# Patient Record
Sex: FEMALE | RURAL
[De-identification: names, ages, dates, MRNs, and addresses within clinical notes are randomized per-mention and may not be internally consistent; named-entity substitution may affect disease eponyms.]

---

## 2019-01-15 ENCOUNTER — HISTORICAL (OUTPATIENT)
Dept: ADMINISTRATIVE | Facility: HOSPITAL | Age: 74
End: 2019-01-15

## 2019-01-21 LAB
LAB AP CLINICAL INFORMATION: NORMAL
LAB AP DIAGNOSIS - HISTORICAL: NORMAL
LAB AP GROSS PATHOLOGY - HISTORICAL: NORMAL
LAB AP SPECIMEN SUBMITTED - HISTORICAL: NORMAL

## 2019-10-29 ENCOUNTER — HISTORICAL (OUTPATIENT)
Dept: ADMINISTRATIVE | Facility: HOSPITAL | Age: 74
End: 2019-10-29

## 2024-10-11 ENCOUNTER — HOSPITAL ENCOUNTER (EMERGENCY)
Facility: HOSPITAL | Age: 79
Discharge: HOME | End: 2024-10-11
Payer: MEDICARE

## 2024-10-11 VITALS
BODY MASS INDEX: 38.57 KG/M2 | HEIGHT: 66 IN | SYSTOLIC BLOOD PRESSURE: 122 MMHG | OXYGEN SATURATION: 92 % | WEIGHT: 240 LBS | TEMPERATURE: 99 F | HEART RATE: 115 BPM | RESPIRATION RATE: 18 BRPM | DIASTOLIC BLOOD PRESSURE: 78 MMHG

## 2024-10-11 DIAGNOSIS — S60.221A CONTUSION OF RIGHT HAND, INITIAL ENCOUNTER: Primary | ICD-10-CM

## 2024-10-11 DIAGNOSIS — R52 PAIN: ICD-10-CM

## 2024-10-11 PROCEDURE — 99284 EMERGENCY DEPT VISIT MOD MDM: CPT | Mod: 25

## 2024-10-11 PROCEDURE — 63600175 PHARM REV CODE 636 W HCPCS

## 2024-10-11 PROCEDURE — 96374 THER/PROPH/DIAG INJ IV PUSH: CPT

## 2024-10-11 PROCEDURE — 96375 TX/PRO/DX INJ NEW DRUG ADDON: CPT

## 2024-10-11 RX ORDER — HYDROXYZINE HYDROCHLORIDE 25 MG/1
25 TABLET, FILM COATED ORAL
COMMUNITY

## 2024-10-11 RX ORDER — DOXAZOSIN 1 MG/1
1 TABLET ORAL
COMMUNITY
Start: 2024-07-14

## 2024-10-11 RX ORDER — MORPHINE SULFATE 4 MG/ML
2 INJECTION, SOLUTION INTRAMUSCULAR; INTRAVENOUS
Status: COMPLETED | OUTPATIENT
Start: 2024-10-11 | End: 2024-10-11

## 2024-10-11 RX ORDER — TRAMADOL HYDROCHLORIDE 50 MG/1
50 TABLET ORAL EVERY 6 HOURS PRN
Qty: 10 TABLET | Refills: 0 | Status: SHIPPED | OUTPATIENT
Start: 2024-10-11

## 2024-10-11 RX ORDER — FUROSEMIDE 20 MG/1
40 TABLET ORAL
COMMUNITY

## 2024-10-11 RX ORDER — FLECAINIDE ACETATE 50 MG/1
50 TABLET ORAL
COMMUNITY

## 2024-10-11 RX ORDER — ONDANSETRON HYDROCHLORIDE 2 MG/ML
4 INJECTION, SOLUTION INTRAVENOUS
Status: DISCONTINUED | OUTPATIENT
Start: 2024-10-11 | End: 2024-10-11

## 2024-10-11 RX ORDER — MORPHINE SULFATE 4 MG/ML
2 INJECTION, SOLUTION INTRAMUSCULAR; INTRAVENOUS
Status: DISCONTINUED | OUTPATIENT
Start: 2024-10-11 | End: 2024-10-11

## 2024-10-11 RX ORDER — ONDANSETRON HYDROCHLORIDE 2 MG/ML
4 INJECTION, SOLUTION INTRAVENOUS
Status: COMPLETED | OUTPATIENT
Start: 2024-10-11 | End: 2024-10-11

## 2024-10-11 RX ORDER — METOPROLOL SUCCINATE 25 MG/1
25 TABLET, EXTENDED RELEASE ORAL
COMMUNITY

## 2024-10-11 RX ORDER — DILTIAZEM HYDROCHLORIDE 240 MG/1
240 CAPSULE, COATED, EXTENDED RELEASE ORAL
COMMUNITY

## 2024-10-11 RX ORDER — ACETAMINOPHEN, DIPHENHYDRAMINE HCL, PHENYLEPHRINE HCL 325; 25; 5 MG/1; MG/1; MG/1
TABLET ORAL
COMMUNITY

## 2024-10-11 RX ORDER — DOCUSATE SODIUM 100 MG/1
100 CAPSULE, LIQUID FILLED ORAL DAILY
COMMUNITY

## 2024-10-11 RX ORDER — IBUPROFEN 100 MG/5ML
1000 SUSPENSION, ORAL (FINAL DOSE FORM) ORAL 2 TIMES DAILY
COMMUNITY

## 2024-10-11 RX ORDER — OMEPRAZOLE 40 MG/1
40 CAPSULE, DELAYED RELEASE ORAL
COMMUNITY

## 2024-10-11 RX ORDER — LOSARTAN POTASSIUM 100 MG/1
0.5 TABLET ORAL 2 TIMES DAILY
COMMUNITY

## 2024-10-11 RX ADMIN — ONDANSETRON 4 MG: 2 INJECTION INTRAMUSCULAR; INTRAVENOUS at 10:10

## 2024-10-11 RX ADMIN — MORPHINE SULFATE 2 MG: 4 INJECTION, SOLUTION INTRAMUSCULAR; INTRAVENOUS at 10:10

## 2024-10-11 NOTE — ED TRIAGE NOTES
Reports fell into door frame in home on Wednesday and hurt hand.  States woke up yesterday morning with it hurting severely.  Unable to stand for it to be touched.

## 2024-10-11 NOTE — DISCHARGE INSTRUCTIONS
- rest, ice, elevate extremity when not using  - prescription given for thumb spica and wrist splint  - [facility] will contact you with outpatient    appointment  - Return to the ED as needed

## 2024-10-11 NOTE — ED PROVIDER NOTES
Encounter Date: 10/11/2024       History     Chief Complaint   Patient presents with    Hand Injury     WENDY is a 78 y/o  female with a PMHx significant for GERD and Paroxysmal Atrial Fibrillation presents to the ED POV with c/o right hand/wrist pain s/p fall. Patient stated that her current symptoms started yesterday after bracing to stop herself from falling, at which point she struck her hand on the door frame at home. Moderated swelling noted to her hand. Patient is tender mostly around the Basal Carpometacarpal Joint. Patient has bounding radial pulses and brisk capillary refill in right hand. Patient rates her pain as a 10/10.     The history is provided by the patient.   Hand Injury   The incident occurred two days ago. The injury mechanism was a fall. The pain is present in the right hand. The quality of the pain is described as aching and throbbing. The pain is at a severity of 10/10. The pain has been Constant since the incident. Pertinent negatives include no fever and no malaise/fatigue. She reports no foreign bodies present. The symptoms are aggravated by movement and use. She has tried nothing for the symptoms.     Review of patient's allergies indicates:  No Known Allergies  Past Medical History:   Diagnosis Date    GERD (gastroesophageal reflux disease)     Paroxysmal atrial fibrillation      Past Surgical History:   Procedure Laterality Date    CERVICAL SPINE SURGERY      CHOLECYSTECTOMY      HYSTERECTOMY      INSERTION OF PACEMAKER      JOINT REPLACEMENT      right TKR and bilateral THR     No family history on file.  Social History     Tobacco Use    Smoking status: Never    Smokeless tobacco: Never   Substance Use Topics    Alcohol use: Never    Drug use: Never     Review of Systems   Constitutional: Negative.  Negative for fever and malaise/fatigue.   HENT: Negative.     Eyes: Negative.    Respiratory: Negative.     Cardiovascular: Negative.    Gastrointestinal: Negative.    Endocrine:  Negative.    Genitourinary: Negative.    Musculoskeletal:  Positive for arthralgias, joint swelling and myalgias.   Skin: Negative.    Allergic/Immunologic: Negative.    Neurological: Negative.    Hematological: Negative.    Psychiatric/Behavioral: Negative.         Physical Exam     Initial Vitals [10/11/24 0947]   BP Pulse Resp Temp SpO2   (!) 142/83 (!) 122 18 98.7 °F (37.1 °C) 95 %      MAP       --         Physical Exam    Nursing note and vitals reviewed.  Cardiovascular:  Normal rate, regular rhythm, S1 normal, S2 normal, normal heart sounds, intact distal pulses and normal pulses.           Pulmonary/Chest: Effort normal and breath sounds normal.   Musculoskeletal:      Right upper arm: Normal. No swelling, edema, deformity, lacerations, tenderness or bony tenderness.      Right elbow: Normal.      Right forearm: Swelling and tenderness present. No edema, deformity, lacerations or bony tenderness.      Right wrist: Swelling and tenderness present. No deformity, effusion, lacerations, bony tenderness, snuff box tenderness or crepitus. Normal range of motion. Normal pulse.      Right hand: Swelling and tenderness present. No deformity, lacerations or bony tenderness. Decreased range of motion. Decreased strength of finger abduction, thumb/finger opposition and wrist extension. Normal sensation. There is no disruption of two-point discrimination. Normal capillary refill. Normal pulse.     Neurological: She is alert and oriented to person, place, and time. She has normal reflexes.   Skin: Skin is warm, dry and intact. Capillary refill takes less than 2 seconds. No rash noted.   Psychiatric: She has a normal mood and affect. Her speech is normal and behavior is normal. Judgment and thought content normal. Cognition and memory are normal.         Medical Screening Exam   See Full Note    ED Course   Procedures  Labs Reviewed - No data to display       Imaging Results              X-Ray Hand 3 view Right (Final  result)  Result time 10/11/24 10:26:38      Final result by Tyson Lam MD (10/11/24 10:26:38)                   Impression:      Moderate soft tissue swelling about the right wrist and distal forearm, without acute osseous abnormality.      Electronically signed by: Tyson Lam MD  Date:    10/11/2024  Time:    10:26               Narrative:    EXAMINATION:  XR FOREARM RIGHT; XR WRIST COMPLETE 3 VIEWS RIGHT; XR HAND COMPLETE 3 VIEW RIGHT    CLINICAL HISTORY:  pain;Pain, unspecified    TECHNIQUE:  AP and lateral views of the right forearm were performed.  Three views right wrist.  Three views right hand.    COMPARISON:  None    FINDINGS:  There is no fracture or dislocation.  There is mild degenerative change of the triscaphe articulation and 1st carpometacarpal joint.  Scattered degenerative changes involve the interphalangeal joints range from mild to moderate..  There is moderate soft tissue swelling about the distal forearm and wrist.                                       X-Ray Wrist Complete Right (Final result)  Result time 10/11/24 10:26:38      Final result by Tyson Lam MD (10/11/24 10:26:38)                   Impression:      Moderate soft tissue swelling about the right wrist and distal forearm, without acute osseous abnormality.      Electronically signed by: Tyson Lam MD  Date:    10/11/2024  Time:    10:26               Narrative:    EXAMINATION:  XR FOREARM RIGHT; XR WRIST COMPLETE 3 VIEWS RIGHT; XR HAND COMPLETE 3 VIEW RIGHT    CLINICAL HISTORY:  pain;Pain, unspecified    TECHNIQUE:  AP and lateral views of the right forearm were performed.  Three views right wrist.  Three views right hand.    COMPARISON:  None    FINDINGS:  There is no fracture or dislocation.  There is mild degenerative change of the triscaphe articulation and 1st carpometacarpal joint.  Scattered degenerative changes involve the interphalangeal joints range from mild to moderate..  There is  moderate soft tissue swelling about the distal forearm and wrist.                                       X-Ray Forearm Right (Final result)  Result time 10/11/24 10:26:38      Final result by Tyson Lam MD (10/11/24 10:26:38)                   Impression:      Moderate soft tissue swelling about the right wrist and distal forearm, without acute osseous abnormality.      Electronically signed by: Tyson Lam MD  Date:    10/11/2024  Time:    10:26               Narrative:    EXAMINATION:  XR FOREARM RIGHT; XR WRIST COMPLETE 3 VIEWS RIGHT; XR HAND COMPLETE 3 VIEW RIGHT    CLINICAL HISTORY:  pain;Pain, unspecified    TECHNIQUE:  AP and lateral views of the right forearm were performed.  Three views right wrist.  Three views right hand.    COMPARISON:  None    FINDINGS:  There is no fracture or dislocation.  There is mild degenerative change of the triscaphe articulation and 1st carpometacarpal joint.  Scattered degenerative changes involve the interphalangeal joints range from mild to moderate..  There is moderate soft tissue swelling about the distal forearm and wrist.                                    X-Rays:   Independently Interpreted Readings:   Other Readings:  Reading Physician Reading Date Result Priority  Tyson Lam MD  [phone]  [phone] 10/11/2024     Narrative & Impression  EXAMINATION:  XR FOREARM RIGHT; XR WRIST COMPLETE 3 VIEWS RIGHT; XR HAND COMPLETE 3 VIEW RIGHT     CLINICAL HISTORY:  pain;Pain, unspecified     TECHNIQUE:  AP and lateral views of the right forearm were performed.  Three views right wrist.  Three views right hand.     COMPARISON:  None     FINDINGS:  There is no fracture or dislocation.  There is mild degenerative change of the triscaphe articulation and 1st carpometacarpal joint.  Scattered degenerative changes involve the interphalangeal joints range from mild to moderate..  There is moderate soft tissue swelling about the distal forearm and  wrist.     Impression:     Moderate soft tissue swelling about the right wrist and distal forearm, without acute osseous abnormality.        Electronically signed by:Tyson Lam MD  Date:                                            10/11/2024  Reading Physician Reading Date Result Priority  Tyson Lam MD  [phone]  [phone] 10/11/2024     Narrative & Impression  EXAMINATION:  XR FOREARM RIGHT; XR WRIST COMPLETE 3 VIEWS RIGHT; XR HAND COMPLETE 3 VIEW RIGHT     CLINICAL HISTORY:  pain;Pain, unspecified     TECHNIQUE:  AP and lateral views of the right forearm were performed.  Three views right wrist.  Three views right hand.     COMPARISON:  None     FINDINGS:  There is no fracture or dislocation.  There is mild degenerative change of the triscaphe articulation and 1st carpometacarpal joint.  Scattered degenerative changes involve the interphalangeal joints range from mild to moderate..  There is moderate soft tissue swelling about the distal forearm and wrist.     Impression:     Moderate soft tissue swelling about the   Reading Physician Reading Date Result Priority  Tyson Lam MD  [phone]  [phone] 10/11/2024     Narrative & Impression  EXAMINATION:  XR FOREARM RIGHT; XR WRIST COMPLETE 3 VIEWS RIGHT; XR HAND COMPLETE 3 VIEW RIGHT     CLINICAL HISTORY:  pain;Pain, unspecified     TECHNIQUE:  AP and lateral views of the right forearm were performed.  Three views right wrist.  Three views right hand.     COMPARISON:  None     FINDINGS:  There is no fracture or dislocation.  There is mild degenerative change of the triscaphe articulation and 1st carpometacarpal joint.  Scattered degenerative changes involve the interphalangeal joints range from mild to moderate..  There is moderate soft tissue swelling about the distal forearm and wrist.     Impression:     Moderate soft tissue swelling about the right wrist and distal forearm, without acute osseous abnormality.         Electronically signed by:Tyson Lam MD  Date:                                            10/11/2024  Time:                                           10:26      Medications   morphine injection 2 mg (2 mg Intravenous Given 10/11/24 1013)   ondansetron injection 4 mg (4 mg Intravenous Given 10/11/24 1012)     Medical Decision Making  WENDY is a 78 y/o  female with a PMHx significant for GERD and Paroxysmal Atrial Fibrillation presents to the ED POV with c/o right hand/wrist pain s/p fall. Patient stated that her current symptoms started yesterday after bracing to stop herself from falling, at which point she struck her hand on the door frame at home. Moderated swelling noted to her hand. Patient is tender mostly around the Basal Carpometacarpal Joint. Patient has bounding radial pulses and brisk capillary refill in right hand. Patient rates her pain as a 10/10.     The history is provided by the patient.   Hand Injury   The incident occurred two days ago. The injury mechanism was a fall. The pain is present in the right hand. The quality of the pain is described as aching and throbbing. The pain is at a severity of 10/10. The pain has been Constant since the incident. Pertinent negatives include no fever and no malaise/fatigue. She reports no foreign bodies present. The symptoms are aggravated by movement and use. She has tried nothing for the symptoms.       Amount and/or Complexity of Data Reviewed  Radiology: ordered.  Discussion of management or test interpretation with external provider(s): - Wrist Brace and Thumb Spica Splint   - Ambulatory Ortho Referral with Dr. Kebede placed for Rigth 1st Digit Basal Carpometacarpal Joint  - Rx for Tramadol given  - Instruction to [facility] given     Risk  Prescription drug management.               ED Course as of 10/11/24 1054   Fri Oct 11, 2024   1039 Xray images/report review by me and discussed with patient and daughter.Discharge instructions given along  with strict return precautions, patient verbalizes understanding.   [AC]      ED Course User Index  [AC] Esa Kurtz FNP                           Clinical Impression:   Final diagnoses:  [R52] Pain  [S60.221A] Contusion of right hand, initial encounter (Primary)        ED Disposition Condition    Discharge Stable          ED Prescriptions       Medication Sig Dispense Start Date End Date Auth. Provider    traMADoL (ULTRAM) 50 mg tablet Take 1 tablet (50 mg total) by mouth every 6 (six) hours as needed for Pain. 10 tablet 10/11/2024 -- Esa Kurtz FNP          Follow-up Information       Follow up With Specialties Details Why Contact Info    Donovan Hall II, MD Family Medicine   [address] [facility]  INTERNAL MEDICINE CLINIC  [facility] [address]  [phone]               Esa Kurtz FNP  10/11/24 8650

## 2024-10-12 ENCOUNTER — TELEPHONE (OUTPATIENT)
Dept: EMERGENCY MEDICINE | Facility: HOSPITAL | Age: 79
End: 2024-10-12
Payer: MEDICARE